# Patient Record
Sex: MALE | URBAN - METROPOLITAN AREA
[De-identification: names, ages, dates, MRNs, and addresses within clinical notes are randomized per-mention and may not be internally consistent; named-entity substitution may affect disease eponyms.]

---

## 2021-03-31 ENCOUNTER — OFFICE VISIT (OUTPATIENT)
Dept: URBAN - METROPOLITAN AREA CLINIC 85 | Facility: CLINIC | Age: 56
End: 2021-03-31
Payer: MEDICARE

## 2021-03-31 DIAGNOSIS — H53.2 DIPLOPIA: Primary | ICD-10-CM

## 2021-03-31 DIAGNOSIS — Z86.73 PERSONAL HISTORY OF CEREBRAL INFARCTION W/O RESIDUAL DEFICIT: ICD-10-CM

## 2021-03-31 PROCEDURE — 92004 COMPRE OPH EXAM NEW PT 1/>: CPT | Performed by: OPHTHALMOLOGY

## 2021-03-31 ASSESSMENT — INTRAOCULAR PRESSURE
OS: 16
OD: 16

## 2021-03-31 NOTE — IMPRESSION/PLAN
Impression: Diplopia: H53.2. Plan: Discussed findings with patient and spouse. Advised diplopia effect will likely remain permanent. Patching OS and wear +2.50 or +2.00 for reading.

## 2021-03-31 NOTE — IMPRESSION/PLAN
Impression: Cerebral infarction, unspecified: I63.9. Plan: Discussed longstanding effects from CVA. Recommended yearly exams or as needed.

## 2021-04-21 NOTE — IMPRESSION/PLAN
Impression: Personal history of cerebral infarction w/o residual deficit: Z86.73.  Plan: Importance of follow up with [facility]

## 2023-10-19 ENCOUNTER — OFFICE VISIT (OUTPATIENT)
Dept: URBAN - METROPOLITAN AREA CLINIC 85 | Facility: CLINIC | Age: 58
End: 2023-10-19
Payer: MEDICARE

## 2023-10-19 DIAGNOSIS — H53.2 DIPLOPIA: Primary | ICD-10-CM

## 2023-10-19 DIAGNOSIS — H55.00 NYSTAGMUS: ICD-10-CM

## 2023-10-19 DIAGNOSIS — Z86.73 PERSONAL HISTORY OF CEREBRAL INFARCTION W/O RESIDUAL DEFICIT: ICD-10-CM

## 2023-10-19 DIAGNOSIS — H04.122 TEAR FILM INSUFFICIENCY OF LEFT LACRIMAL GLAND: ICD-10-CM

## 2023-10-19 PROCEDURE — 92014 COMPRE OPH EXAM EST PT 1/>: CPT | Performed by: OPHTHALMOLOGY

## 2023-10-19 ASSESSMENT — KERATOMETRY
OS: 42.25
OD: 42.75

## 2023-10-19 ASSESSMENT — INTRAOCULAR PRESSURE
OS: 15
OD: 15

## 2023-10-19 ASSESSMENT — VISUAL ACUITY
OS: 20/30
OD: 20/40

## 2023-12-11 ENCOUNTER — OFFICE VISIT (OUTPATIENT)
Dept: URBAN - METROPOLITAN AREA CLINIC 82 | Facility: CLINIC | Age: 58
End: 2023-12-11
Payer: MEDICARE

## 2023-12-11 DIAGNOSIS — H52.03 HYPERMETROPIA, BILATERAL: ICD-10-CM

## 2023-12-11 DIAGNOSIS — H55.00 NYSTAGMUS: ICD-10-CM

## 2023-12-11 DIAGNOSIS — H53.2 DIPLOPIA: Primary | ICD-10-CM

## 2023-12-11 PROCEDURE — 99202 OFFICE O/P NEW SF 15 MIN: CPT | Performed by: OPTOMETRIST

## 2023-12-11 ASSESSMENT — KERATOMETRY
OS: 42.38
OD: 42.50

## 2023-12-11 ASSESSMENT — INTRAOCULAR PRESSURE
OS: 11
OD: 10